# Patient Record
Sex: MALE | Race: BLACK OR AFRICAN AMERICAN | NOT HISPANIC OR LATINO | URBAN - METROPOLITAN AREA
[De-identification: names, ages, dates, MRNs, and addresses within clinical notes are randomized per-mention and may not be internally consistent; named-entity substitution may affect disease eponyms.]

---

## 2017-01-22 ENCOUNTER — EMERGENCY (EMERGENCY)
Facility: HOSPITAL | Age: 42
LOS: 1 days | Discharge: HOME | End: 2017-01-22
Attending: EMERGENCY MEDICINE | Admitting: OTOLARYNGOLOGY
Payer: COMMERCIAL

## 2017-01-22 VITALS
OXYGEN SATURATION: 99 % | DIASTOLIC BLOOD PRESSURE: 103 MMHG | TEMPERATURE: 98 F | RESPIRATION RATE: 16 BRPM | SYSTOLIC BLOOD PRESSURE: 145 MMHG | HEART RATE: 84 BPM

## 2017-01-22 PROCEDURE — 99284 EMERGENCY DEPT VISIT MOD MDM: CPT

## 2017-01-22 RX ORDER — IBUPROFEN 200 MG
600 TABLET ORAL ONCE
Qty: 0 | Refills: 0 | Status: COMPLETED | OUTPATIENT
Start: 2017-01-22 | End: 2017-01-22

## 2017-01-22 RX ORDER — DIAZEPAM 5 MG
1 TABLET ORAL
Qty: 12 | Refills: 0
Start: 2017-01-22 | End: 2017-01-26

## 2017-01-22 RX ADMIN — Medication 600 MILLIGRAM(S): at 15:26

## 2017-01-22 NOTE — ED PROVIDER NOTE - NS ED MD SCRIBE ATTENDING SCRIBE SECTIONS
VITAL SIGNS( Pullset)/PHYSICAL EXAM/HISTORY OF PRESENT ILLNESS/REVIEW OF SYSTEMS/DISPOSITION/PAST MEDICAL/SURGICAL/SOCIAL HISTORY/HIV

## 2017-01-22 NOTE — ED PROVIDER NOTE - PLAN OF CARE
Follow with your PMD within 48-72 hours.  Rest, no heavy lifting.  Take Motrin 600 mg every 8 hours for pain with food, Valium 5mg every 8 hours as needed for muscle spasm- caution drowsiness /do not drive. Any worsening pain, weakness, numbness, bowel or urinary incontinence return to ER

## 2017-01-22 NOTE — ED PROVIDER NOTE - CONSTITUTIONAL, MLM
normal... Well appearing adult male, well nourished, awake, alert, oriented to person, place, time/situation and in no apparent distress.

## 2017-01-22 NOTE — ED PROVIDER NOTE - DETAILS:
MD Rosales:  The scribe's documentation has been prepared under my direction and personally reviewed by me in its entirety. I confirm that the note above accurately reflects all work, treatment, procedures, and medical decision making performed by me.  MD Rosales:  see the MDM & progress notes for my I&P.

## 2017-01-22 NOTE — ED PROVIDER NOTE - UPPER EXTREMITY EXAM, LEFT
Normal symmetrical  strength and thumb extension. Full finger adduction and abduction. 5/5  FROM of left shoulder without pain. No palpable effusion.

## 2017-01-22 NOTE — ED PROVIDER NOTE - MEDICAL DECISION MAKING DETAILS
42 y/o M pt with likely high thoracic, low cervical radiculopathy without any evidence for weakness. Plan: NSAIDs, XR, prescription for muscle relaxant, follow up with PCP, and physical therapy. Possible MRI if symptoms persist for more than 4 weeks or if patient develops weakness. MD Rosales:  40 y/o M pt with likely high thoracic, low cervical radiculopathy without any weakness. Plan: NSAIDs, XR, prescription for muscle relaxant, follow up with PCP, and physical therapy. Possible MRI if symptoms persist for more than 4 weeks or if patient develops weakness.

## 2017-01-22 NOTE — ED ADULT TRIAGE NOTE - CHIEF COMPLAINT QUOTE
Patient c/o left shoulder/neck pain for about one week  worse on movement, radiating down left arm. Seen by two outpatient MDs and prescribed NSAIDs, steroids  without relief of pain.  Patient is a  and reports heavy lifting. Also reports falling off of daughters hover board about 3 weeks ago.  PMH- htn, hld PSH- spinal fusion

## 2017-01-22 NOTE — ED PROVIDER NOTE - PMH
Hyperlipidemia    Hypertension    Lipoma  left, arm and back  Lumbar Disc Disorder    Pneumonia  2009

## 2017-01-22 NOTE — ED PROVIDER NOTE - OBJECTIVE STATEMENT
40 y/o M pt with PMHx of HTN and HLD presents with 1 week of left shoulder/upper back pain worsened with certain body positions. Also endorses thumb numbness. Patient cannot pinpoint any injury that may have caused this but he works as a  for [employer] and does heavy physical labor for a living. Pain was much worse this morning after sleeping awkwardly on the arm. Minimal relief from Aleve. Denies CP, SOB, or weakness.

## 2017-01-25 DIAGNOSIS — T18.108A UNSPECIFIED FOREIGN BODY IN ESOPHAGUS CAUSING OTHER INJURY, INITIAL ENCOUNTER: ICD-10-CM

## 2017-01-28 DIAGNOSIS — Z98.1 ARTHRODESIS STATUS: Chronic | ICD-10-CM

## 2020-03-03 ENCOUNTER — OUTPATIENT (OUTPATIENT)
Dept: OUTPATIENT SERVICES | Facility: HOSPITAL | Age: 45
LOS: 1 days | Discharge: HOME | End: 2020-03-03
Payer: COMMERCIAL

## 2020-03-03 DIAGNOSIS — Z98.1 ARTHRODESIS STATUS: Chronic | ICD-10-CM

## 2020-03-03 LAB
ANION GAP SERPL CALC-SCNC: 11 MMO/L — SIGNIFICANT CHANGE UP (ref 7–14)
BUN SERPL-MCNC: 14 MG/DL — SIGNIFICANT CHANGE UP (ref 7–23)
CALCIUM SERPL-MCNC: 9.1 MG/DL — SIGNIFICANT CHANGE UP (ref 8.4–10.5)
CHLORIDE SERPL-SCNC: 103 MMOL/L — SIGNIFICANT CHANGE UP (ref 98–107)
CO2 SERPL-SCNC: 25 MMOL/L — SIGNIFICANT CHANGE UP (ref 22–31)
CREAT SERPL-MCNC: 1.05 MG/DL — SIGNIFICANT CHANGE UP (ref 0.5–1.3)
GLUCOSE SERPL-MCNC: 96 MG/DL — SIGNIFICANT CHANGE UP (ref 70–99)
HCT VFR BLD CALC: 44.7 % — SIGNIFICANT CHANGE UP (ref 39–50)
HGB BLD-MCNC: 15.7 G/DL — SIGNIFICANT CHANGE UP (ref 13–17)
MCHC RBC-ENTMCNC: 29.9 PG — SIGNIFICANT CHANGE UP (ref 27–34)
MCHC RBC-ENTMCNC: 35 % — SIGNIFICANT CHANGE UP (ref 32–36)
MCV RBC AUTO: 85.5 FL — SIGNIFICANT CHANGE UP (ref 80–100)
PLATELET # BLD AUTO: 329 K/UL — SIGNIFICANT CHANGE UP (ref 150–400)
PMV BLD: 8.5 FL — SIGNIFICANT CHANGE UP (ref 7–13)
POTASSIUM SERPL-MCNC: 4.1 MMOL/L — SIGNIFICANT CHANGE UP (ref 3.5–5.3)
POTASSIUM SERPL-SCNC: 4.1 MMOL/L — SIGNIFICANT CHANGE UP (ref 3.5–5.3)
RBC # BLD: 5.25 M/UL — SIGNIFICANT CHANGE UP (ref 4.2–5.8)
RBC # FLD: 12.6 % — SIGNIFICANT CHANGE UP (ref 10.3–14.5)
SODIUM SERPL-SCNC: 139 MMOL/L — SIGNIFICANT CHANGE UP (ref 135–145)
WBC # BLD: 7.63 K/UL — SIGNIFICANT CHANGE UP (ref 3.8–10.5)
WBC # FLD AUTO: 7.63 K/UL — SIGNIFICANT CHANGE UP (ref 3.8–10.5)

## 2020-03-03 PROCEDURE — 93010 ELECTROCARDIOGRAM REPORT: CPT

## 2020-03-03 RX ORDER — METOPROLOL TARTRATE 50 MG
1 TABLET ORAL
Qty: 0 | Refills: 0 | DISCHARGE

## 2020-03-03 RX ORDER — SODIUM CHLORIDE 9 MG/ML
1000 INJECTION INTRAMUSCULAR; INTRAVENOUS; SUBCUTANEOUS
Refills: 0 | Status: DISCONTINUED | OUTPATIENT
Start: 2020-03-03 | End: 2020-03-18

## 2020-03-03 RX ORDER — ATORVASTATIN CALCIUM 80 MG/1
1 TABLET, FILM COATED ORAL
Qty: 0 | Refills: 0 | DISCHARGE

## 2020-03-03 RX ORDER — SODIUM CHLORIDE 9 MG/ML
3 INJECTION INTRAMUSCULAR; INTRAVENOUS; SUBCUTANEOUS EVERY 8 HOURS
Refills: 0 | Status: DISCONTINUED | OUTPATIENT
Start: 2020-03-03 | End: 2020-03-18

## 2020-03-03 RX ORDER — AMLODIPINE BESYLATE AND BENAZEPRIL HYDROCHLORIDE 10; 20 MG/1; MG/1
1 CAPSULE ORAL
Qty: 0 | Refills: 0 | DISCHARGE

## 2020-03-03 NOTE — H&P CARDIOLOGY - REVIEW OF SYSTEMS
The patient denies chest pain, dizziness, presyncope, syncope, b/l lower extremities edema, abdominal pain, N/V/D/C, melena, hematochezia, h/o DVT, PE, JOSE MANUEL, fever, chills, urinary symptoms, hematuria, recent travel, sick contacts. The patient denies chest pain, dizziness, presyncope, syncope, b/l lower extremities edema, abdominal pain, N/V/D/C, melena, hematochezia, h/o DVT, PE, fever, chills, urinary symptoms, hematuria, recent travel, sick contacts.

## 2020-03-03 NOTE — H&P CARDIOLOGY - HISTORY OF PRESENT ILLNESS
This is  a 45 yo male with a PMH of HTN, HLD presented today for elective cardiac cath.  The Patient reports chest pain on and off and it comes out of nowhere. The Patient describes pain as a sharp and it usually resolves quickly.  Patient states that he had  recent abnormal stress test and his doctor concerns about the result. Denies chest pain, palpitations, SOB, nausea, vomiting. This is  a 45 yo male with a PMH of HTN, HLD, JOSE MANUEL presented today for elective cardiac cath.  The Patient reports chest pain on and off and it comes out of nowhere. The Patient describes pain as a sharp and it usually resolves quickly.  Patient states that he had  recent abnormal stress test and his doctor concerns about the result. Denies chest pain, palpitations, SOB, nausea, vomiting.

## 2020-09-30 ENCOUNTER — TRANSCRIPTION ENCOUNTER (OUTPATIENT)
Age: 45
End: 2020-09-30

## 2021-06-18 PROBLEM — G47.33 OBSTRUCTIVE SLEEP APNEA (ADULT) (PEDIATRIC): Chronic | Status: ACTIVE | Noted: 2020-03-03

## 2021-06-25 ENCOUNTER — APPOINTMENT (OUTPATIENT)
Dept: ORTHOPEDIC SURGERY | Facility: CLINIC | Age: 46
End: 2021-06-25
Payer: COMMERCIAL

## 2021-06-25 VITALS
DIASTOLIC BLOOD PRESSURE: 88 MMHG | OXYGEN SATURATION: 97 % | HEART RATE: 83 BPM | BODY MASS INDEX: 35 KG/M2 | WEIGHT: 250 LBS | HEIGHT: 71 IN | SYSTOLIC BLOOD PRESSURE: 146 MMHG

## 2021-06-25 DIAGNOSIS — M25.552 PAIN IN RIGHT HIP: ICD-10-CM

## 2021-06-25 DIAGNOSIS — M16.0 BILATERAL PRIMARY OSTEOARTHRITIS OF HIP: ICD-10-CM

## 2021-06-25 DIAGNOSIS — Z82.49 FAMILY HISTORY OF ISCHEMIC HEART DISEASE AND OTHER DISEASES OF THE CIRCULATORY SYSTEM: ICD-10-CM

## 2021-06-25 DIAGNOSIS — Z87.891 PERSONAL HISTORY OF NICOTINE DEPENDENCE: ICD-10-CM

## 2021-06-25 DIAGNOSIS — M25.551 PAIN IN RIGHT HIP: ICD-10-CM

## 2021-06-25 PROCEDURE — 73522 X-RAY EXAM HIPS BI 3-4 VIEWS: CPT

## 2021-06-25 PROCEDURE — 99204 OFFICE O/P NEW MOD 45 MIN: CPT

## 2021-06-25 PROCEDURE — 99072 ADDL SUPL MATRL&STAF TM PHE: CPT

## 2021-06-25 NOTE — DISCUSSION/SUMMARY
[de-identified] : This is a 46-year-old male with severe bilateral hip degenerative arthritis, R>L. \par \par The natural history and treatment of degenerative arthritis was discussed with the patient at length today. The spectrum of treatment including nonoperative modalities to surgical intervention was elucidated. Noninvasive and nonoperative treatment modalities include weight reduction, activity modification with low impact exercise, as needed use of acetaminophen or anti-inflammatory medications if tolerated, and physical therapy. Further treatments can include corticosteroid injection and the use of viscosupplementation with hyaluronic acid injections. Definitive surgical treatment can certainly include total joint arthroplasty also. The risks and benefits of each treatment options was discussed and all questions were answered.\par \par Given the severity of his right hip arthritis he is indicated for right total hip arthroplasty which may be performed on an elective basis.  We had a lengthy discussion regarding the risks and benefits of this procedure including the limited life span of the components.  He therefore will take this into consideration and will return when he is ready to schedule surgery.  We will then schedule for a CT scan to guide the robotic assisted procedure.  We also discussed advantages and disadvantages of the anterior versus posterior approach which may be considered including need for bone grafting or use of augments due to the large subchondral cyst that he has on the right hip.\par

## 2021-06-25 NOTE — HISTORY OF PRESENT ILLNESS
[FreeTextEntry1] : This is a 46M with a history of a lumbar fusion (2012, Dr. Kaur) who is presenting for evaluation of chronic bilateral hip pain (R>L) which has progressed over the past 3 years.  Denies any trauma or constitutional symptoms.  He has 4 out of 10 pain at rest and up to 10 out of 10 pain with walking.  He does not use a cane or any assistive devices.  He has not tried physical therapy or injections yet.  He has tried naproxen and meloxicam which do help.  He works as a .

## 2021-06-25 NOTE — DATA REVIEWED
[de-identified] : 6/25/2021–AP pelvis, bilateral hip (AP, lateral) x-rays: Visible lumbar fusion hardware is present.  There are advanced bilateral hip degenerative changes, most severe in the right hip with complete bone-on-bone disease with evidence of a large subchondral cyst measuring 31 x 29 mm with surrounding sclerotic borders.  There are otherwise no fractures, dislocations, or osseous lesions.

## 2021-06-25 NOTE — PHYSICAL EXAM
[FreeTextEntry1] : General: well nourished, in no acute distress, alert and oriented to person, place and time.\par Psychiatric: normal mood and affect, no abnormal movements or speech patterns.\par Eyes: vision intact without deficits, sclera and conjunctiva were normal, pupils were equal in size. \par ENT: Ears and nose were normal in appearance. No thyromegaly.\par Lymph: no enlarged nodes, no lymphedema in extremity.\par Respiratory: Normal respiratory rhythm and effort. No wheezing detected without auscultation. No shortness of breath or respiratory distress.\par Cardiac: no cardiac related leg swelling.\par Neurology: normal gross sensation in extremities to light touch.\par Abdomen: soft, non-tender, tympanic, no masses.\par \par \par B/L LE:\par \par Skin CDI. No ecchymosis or swelling. \par No instability. No TTP over GT. No palpable masses. No lymphedema.\par \par R hip ROM: FF: 70. IR: 0. ER: 5 \par L hip ROM: FF: 80. IR: 5. ER: 30. \par EHL/FHL/GS/TA 5/5. S/S/SP/DP/T SILT. Toes warm, BCR. Compartments soft.

## 2025-03-18 ENCOUNTER — APPOINTMENT (OUTPATIENT)
Dept: COLORECTAL SURGERY | Facility: CLINIC | Age: 50
End: 2025-03-18